# Patient Record
Sex: FEMALE | Race: BLACK OR AFRICAN AMERICAN | Employment: FULL TIME | URBAN - METROPOLITAN AREA
[De-identification: names, ages, dates, MRNs, and addresses within clinical notes are randomized per-mention and may not be internally consistent; named-entity substitution may affect disease eponyms.]

---

## 2020-12-21 ENCOUNTER — HOSPITAL ENCOUNTER (EMERGENCY)
Age: 35
Discharge: HOME | End: 2020-12-22
Attending: EMERGENCY MEDICINE
Payer: COMMERCIAL

## 2020-12-21 VITALS
DIASTOLIC BLOOD PRESSURE: 84 MMHG | HEART RATE: 90 BPM | SYSTOLIC BLOOD PRESSURE: 136 MMHG | RESPIRATION RATE: 20 BRPM | TEMPERATURE: 98.4 F | OXYGEN SATURATION: 100 %

## 2020-12-21 PROCEDURE — 96372 THER/PROPH/DIAG INJ SC/IM: CPT

## 2020-12-21 PROCEDURE — 99282 EMERGENCY DEPT VISIT SF MDM: CPT

## 2020-12-21 PROCEDURE — 96375 TX/PRO/DX INJ NEW DRUG ADDON: CPT

## 2020-12-21 PROCEDURE — 6360000002 HC RX W HCPCS: Performed by: EMERGENCY MEDICINE

## 2020-12-21 PROCEDURE — 2500000003 HC RX 250 WO HCPCS: Performed by: EMERGENCY MEDICINE

## 2020-12-21 PROCEDURE — 96374 THER/PROPH/DIAG INJ IV PUSH: CPT

## 2020-12-21 PROCEDURE — 6360000002 HC RX W HCPCS: Performed by: STUDENT IN AN ORGANIZED HEALTH CARE EDUCATION/TRAINING PROGRAM

## 2020-12-21 PROCEDURE — 6360000002 HC RX W HCPCS

## 2020-12-21 RX ORDER — DIPHENHYDRAMINE HCL 25 MG
25 CAPSULE ORAL EVERY 4 HOURS PRN
Qty: 20 CAPSULE | Refills: 0 | Status: SHIPPED | OUTPATIENT
Start: 2020-12-21 | End: 2020-12-31

## 2020-12-21 RX ORDER — DIPHENHYDRAMINE HYDROCHLORIDE 50 MG/ML
25 INJECTION INTRAMUSCULAR; INTRAVENOUS ONCE
Status: DISCONTINUED | OUTPATIENT
Start: 2020-12-21 | End: 2020-12-21

## 2020-12-21 RX ORDER — DEXAMETHASONE SODIUM PHOSPHATE 10 MG/ML
10 INJECTION INTRAMUSCULAR; INTRAVENOUS ONCE
Status: COMPLETED | OUTPATIENT
Start: 2020-12-21 | End: 2020-12-21

## 2020-12-21 RX ORDER — DIPHENHYDRAMINE HYDROCHLORIDE 50 MG/ML
25 INJECTION INTRAMUSCULAR; INTRAVENOUS ONCE
Status: COMPLETED | OUTPATIENT
Start: 2020-12-21 | End: 2020-12-21

## 2020-12-21 RX ADMIN — DEXAMETHASONE SODIUM PHOSPHATE 10 MG: 10 INJECTION INTRAMUSCULAR; INTRAVENOUS at 21:41

## 2020-12-21 RX ADMIN — FAMOTIDINE 20 MG: 10 INJECTION INTRAVENOUS at 21:42

## 2020-12-21 RX ADMIN — EPINEPHRINE 0.3 MG: 1 INJECTION INTRAMUSCULAR; INTRAVENOUS; SUBCUTANEOUS at 21:47

## 2020-12-21 RX ADMIN — DIPHENHYDRAMINE HYDROCHLORIDE 25 MG: 50 INJECTION, SOLUTION INTRAMUSCULAR; INTRAVENOUS at 21:43

## 2020-12-22 NOTE — ED PROVIDER NOTES
back, CTAB, regular rate and rhythm, no extra heart sounds, no abdominal tenderness, distal pulses intact and equal bilaterally. DIFFERENTIAL  DIAGNOSIS     PLAN (LABS / IMAGING / EKG):  No orders of the defined types were placed in this encounter. MEDICATIONS ORDERED:  Orders Placed This Encounter   Medications    DISCONTD: EPINEPHrine 1 MG/ML injection     Jefry Davis R: cabinet override    EPINEPHrine 1 MG/ML injection 0.3 mg    DISCONTD: EPINEPHrine 1 MG/ML injection 0.3 mg    diphenhydrAMINE (BENADRYL) injection 25 mg    DISCONTD: famotidine (PEPCID) injection 40 mg    famotidine (PEPCID) injection 20 mg    DISCONTD: diphenhydrAMINE (BENADRYL) injection 25 mg    dexamethasone (DECADRON) injection 10 mg    diphenhydrAMINE (BENADRYL) 25 MG capsule     Sig: Take 1 capsule by mouth every 4 hours as needed for Itching     Dispense:  20 capsule     Refill:  0       DDX: Allergic reaction    DIAGNOSTIC RESULTS / EMERGENCY DEPARTMENT COURSE / [facility]   LAB RESULTS:  No results found for this visit on 12/21/20. IMPRESSION: 59-year-old female presenting with allergic reaction, has a 2-year history of having intermittent allergic reactions, usually resolves with Benadryl, patient did take 50 mg Benadryl prior to arrival, some swelling of the posterior oropharynx and lips, hives, no tongue swelling, no respiratory distress, handling secretions, will administer epinephrine, Benadryl, Pepcid, Decadron, reassess. No indication for labs or imaging at this time. RADIOLOGY:      EKG      All EKG's are interpreted by the Emergency Department Physician who either signs or Co-signs this chart in the absence of a cardiologist.    EMERGENCY DEPARTMENT COURSE:  Patient came to emergency department, HPI and physical exam were conducted. All nursing notes were reviewed. On reassessment, patient has significant improvement in symptoms, swelling is significantly gone down, urticarial rash is no longer present.   Had in-depth discussion with patient regarding discharge versus admission, patient reports that she will follow-up with PCP regarding further work-up of her allergic reactions. Will discharge patient home on Benadryl. Patient remained stable emergency department with improving vital signs and improving symptoms. Gave strict return precautions to the emergency department and discharge patient home. PROCEDURES:      CONSULTS:  None    CRITICAL CARE:  Please see attending note    FINAL IMPRESSION      1.  Allergic reaction, initial encounter          DISPOSITION / PLAN     DISPOSITION Decision To Discharge 12/21/2020 11:35:06 PM      PATIENT REFERRED TO:  [facility]  [address] [place] [address]  [phone]  Go to   As needed, If symptoms worsen    [place] [id number]  [phone]  Schedule an appointment as soon as possible for a visit in 1 day  For establishment of care      DISCHARGE MEDICATIONS:  Discharge Medication List as of 12/21/2020 11:36 PM      START taking these medications    Details   diphenhydrAMINE (BENADRYL) 25 MG capsule Take 1 capsule by mouth every 4 hours as needed for Itching, Disp-20 capsule, R-0Print             Daren Jimenez MD  Emergency Medicine Resident    (Please note that portions of thisnote were completed with a voice recognition program.  Efforts were made to edit the dictations but occasionally words are mis-transcribed.)        Daren Jimenez MD  Resident  12/22/20 1018

## 2020-12-22 NOTE — ED PROVIDER NOTES
with the chief complaint, past medical history, past surgical history, allergies, medications, social and family history as documented unless otherwise noted below. Documentation of the HPI, Physical Exam and Medical Decision Making performed by medical students or scribes is based on my personal performance of the HPI, PE and MDM. For Phys Assistant/ Nurse Practitioner cases/documentation I have had a face to face evaluation of this patient and have completed at least one if not all key elements of the E/M (history, physical exam, and MDM). Additional findings are as noted. For APC cases I have personally evaluated and examined the patient in conjunction with the APC and agree with the treatment plan and disposition of the patient as recorded by the APC.     Bernadine Adams MD  Attending Emergency  Physician       Erika Goode MD  12/21/20 [id number]